# Patient Record
Sex: MALE | Race: WHITE | Employment: OTHER | URBAN - METROPOLITAN AREA
[De-identification: names, ages, dates, MRNs, and addresses within clinical notes are randomized per-mention and may not be internally consistent; named-entity substitution may affect disease eponyms.]

---

## 2019-06-09 ENCOUNTER — HOSPITAL ENCOUNTER (EMERGENCY)
Age: 49
Discharge: HOME | End: 2019-06-09
Attending: EMERGENCY MEDICINE
Payer: COMMERCIAL

## 2019-06-09 VITALS
OXYGEN SATURATION: 99 % | HEIGHT: 70 IN | HEART RATE: 64 BPM | BODY MASS INDEX: 24.9 KG/M2 | RESPIRATION RATE: 18 BRPM | SYSTOLIC BLOOD PRESSURE: 130 MMHG | DIASTOLIC BLOOD PRESSURE: 88 MMHG | TEMPERATURE: 97.9 F | WEIGHT: 173.94 LBS

## 2019-06-09 DIAGNOSIS — H18.892 CORNEAL RUST RING OF LEFT EYE: ICD-10-CM

## 2019-06-09 DIAGNOSIS — S05.02XA ABRASION OF LEFT CORNEA, INITIAL ENCOUNTER: ICD-10-CM

## 2019-06-09 DIAGNOSIS — T15.92XA FOREIGN BODY OF LEFT EYE, INITIAL ENCOUNTER: Primary | ICD-10-CM

## 2019-06-09 PROCEDURE — 99283 EMERGENCY DEPT VISIT LOW MDM: CPT

## 2019-06-09 PROCEDURE — 74011000250 HC RX REV CODE- 250: Performed by: PHYSICIAN ASSISTANT

## 2019-06-09 RX ORDER — KETOROLAC TROMETHAMINE 5 MG/ML
1 SOLUTION OPHTHALMIC 4 TIMES DAILY
Qty: 1 BOTTLE | Refills: 0 | Status: SHIPPED | OUTPATIENT
Start: 2019-06-09 | End: 2019-06-19

## 2019-06-09 RX ORDER — CIPROFLOXACIN HYDROCHLORIDE 3.5 MG/ML
2 SOLUTION/ DROPS TOPICAL 4 TIMES DAILY
Qty: 5 ML | Refills: 0 | Status: SHIPPED | OUTPATIENT
Start: 2019-06-09 | End: 2019-06-19

## 2019-06-09 RX ORDER — HYDROCODONE BITARTRATE AND ACETAMINOPHEN 5; 325 MG/1; MG/1
1 TABLET ORAL
Qty: 10 TAB | Refills: 0 | Status: SHIPPED | OUTPATIENT
Start: 2019-06-09 | End: 2019-06-12

## 2019-06-09 RX ORDER — TETRACAINE HYDROCHLORIDE 5 MG/ML
2 SOLUTION OPHTHALMIC
Status: COMPLETED | OUTPATIENT
Start: 2019-06-09 | End: 2019-06-09

## 2019-06-09 RX ADMIN — TETRACAINE HYDROCHLORIDE 2 DROP: 5 SOLUTION OPHTHALMIC at 16:37

## 2019-06-09 NOTE — ED PROVIDER NOTES
EMERGENCY DEPARTMENT HISTORY AND PHYSICAL EXAM      Date: 6/9/2019  Patient Name: Isaiah Potts    History of Presenting Illness     Chief Complaint   Patient presents with    Eye Pain     left eye pain: per pt he got metal in it        History Provided By: Patient    HPI: Isaiah Potts, 57 y.o. male with no pertinent past medical history, presents to the ED with cc of metal in left eye. The patient reports that he was cutting metal when a small fragment flew off and landed in his left eye. He was seen in urgent care facility, where they were able to visualize a piece of metal in his eye but they did not have a slit-lamp machines and they referred him to the emergency department for removal.  Pain was improved after tetracaine administration there but has now returned. He denies blurred vision, other injuries. His tetanus is up-to-date. There are no other complaints, changes, or physical findings at this time. PCP: Ibeth Ortiz MD    No current facility-administered medications on file prior to encounter. Current Outpatient Medications on File Prior to Encounter   Medication Sig Dispense Refill    oxycodone-acetaminophen (PERCOCET) 5-325 mg per tablet Take 1-2 tablets by mouth every four (4) hours as needed for Pain. 50 tablet 0    docusate sodium (COLACE) 100 mg capsule Take 1 capsule by mouth two (2) times a day. 30 capsule 0       Past History     Past Medical History:  Past Medical History:   Diagnosis Date    Ill-defined condition     cracked vertebrae L 5       Past Surgical History:  Past Surgical History:   Procedure Laterality Date    HX HEENT      wisdom teeth removed       Family History:  Family History   Problem Relation Age of Onset    Heart Disease Father        Social History:  Social History     Tobacco Use    Smoking status: Never Smoker    Smokeless tobacco: Never Used   Substance Use Topics    Alcohol use:  Yes     Alcohol/week: 0.0 oz     Types: 5 - 10 Shots of liquor per week     Comment: 5-10 weekly    Drug use: No       Allergies:  No Known Allergies      Review of Systems   Review of Systems   Constitutional: Negative for chills and fever. HENT: Negative for ear pain and sore throat. Eyes: Positive for pain and redness. Negative for visual disturbance. Respiratory: Negative for cough and shortness of breath. Cardiovascular: Negative for chest pain and palpitations. Gastrointestinal: Negative for abdominal pain, nausea and vomiting. Genitourinary: Negative for dysuria and hematuria. Musculoskeletal: Negative for back pain and gait problem. Skin: Negative for rash and wound. Neurological: Negative for dizziness and headaches. Psychiatric/Behavioral: Negative for behavioral problems and confusion. All other systems reviewed and are negative. Physical Exam   Physical Exam   Constitutional: He is oriented to person, place, and time. He appears well-developed and well-nourished. Uncomfortable appearing. HENT:   Head: Normocephalic and atraumatic. Eyes: Pupils are equal, round, and reactive to light. Conjunctivae and EOM are normal. Lids are everted and swept, no foreign bodies found. Neck: Normal range of motion. Neck supple. Cardiovascular: Normal rate, regular rhythm and normal heart sounds. Pulmonary/Chest: Effort normal and breath sounds normal.   Musculoskeletal: Normal range of motion. Neurological: He is alert and oriented to person, place, and time. He has normal strength. No cranial nerve deficit or sensory deficit. GCS eye subscore is 4. GCS verbal subscore is 5. GCS motor subscore is 6. Skin: Skin is warm and dry. No rash noted. Psychiatric: He has a normal mood and affect. His behavior is normal.   Nursing note and vitals reviewed. Diagnostic Study Results     Labs -   No results found for this or any previous visit (from the past 12 hour(s)).     Radiologic Studies -   No orders to display CT Results  (Last 48 hours)    None        CXR Results  (Last 48 hours)    None            Medical Decision Making   I am the first provider for this patient. I reviewed the vital signs, available nursing notes, past medical history, past surgical history, family history and social history. Vital Signs-Reviewed the patient's vital signs. Vitals:    06/09/19 1505   BP: 130/88   BP 1 Location: Left arm   BP Patient Position: Sitting   Pulse: 64   Resp: 18   Temp: 97.9 °F (36.6 °C)   SpO2: 99%   Weight: 78.9 kg (173 lb 15.1 oz)   Height: 5' 10\" (1.778 m)            Records Reviewed: Nursing Notes and Old Medical Records    Provider Notes (Medical Decision Making):   Pt presents with foreign body in the left eye. The piece of metal appears to be superficial and there is no evidence of pupillary injury. His tetanus is up-to-date. I discussed that I will attempt to remove it but that if I am unable to, he will need to follow-up with ophthalmology for removal.    ED Course:   Initial assessment performed. The patients presenting problems have been discussed, and they are in agreement with the care plan formulated and outlined with them. I have encouraged them to ask questions as they arise throughout their visit. Procedure Note - Foreign Body:  5:00 PM  Performed by: AUDI Meade  Foreign body was seen in the left eye. Procedural sedation was not used. Eye was anesthestized using tetracaine drops. Foreign body removed carefully using the bevel of an 18 gauge. I was successfully able to dislodge the foreign body using this technique and then remove using a cotton tip applicator. Estimated blood loss: none  The procedure took 1-15 minutes, and pt tolerated well. 5:10 PM -after removal foreign body, there was a rust ring noted to the area. Fluorescein staining revealed a corneal abrasion to the area where the foreign body had been but no additional abrasions.   The lid was everted and there were no foreign bodies. I discussed that we will be giving him an ophthalmology referral for follow-up and removal of rust ring. Disposition:  5:18 PM -    The patient has been re-evaluated and is ready for discharge. Reviewed available results with patient. Counseled patient on diagnosis and care plan. Patient has expressed understanding, and all questions have been answered. Patient agrees with plan and agrees to follow up as recommended, or to return to the ED if their symptoms worsen. Discharge instructions have been provided and explained to the patient, along with reasons to return to the ED. PLAN:  1. Discharge Medication List as of 6/9/2019  5:18 PM      START taking these medications    Details   ketorolac (ACULAR) 0.5 % ophthalmic solution Apply 1 Drop to eye four (4) times daily for 10 days. , Normal, Disp-1 Bottle, R-0      ciprofloxacin HCl (CILOXAN) 0.3 % ophthalmic solution Apply 2 Drops to eye four (4) times daily for 10 days. , Normal, Disp-5 mL, R-0      HYDROcodone-acetaminophen (NORCO) 5-325 mg per tablet Take 1 Tab by mouth every four (4) hours as needed for Pain for up to 3 days. Max Daily Amount: 6 Tabs., Print, Disp-10 Tab, R-0         CONTINUE these medications which have NOT CHANGED    Details   oxycodone-acetaminophen (PERCOCET) 5-325 mg per tablet Take 1-2 tablets by mouth every four (4) hours as needed for Pain., Normal, Disp-50 tablet, R-0      docusate sodium (COLACE) 100 mg capsule Take 1 capsule by mouth two (2) times a day., Print, Disp-30 capsule, R-0           2.    Follow-up Information     Follow up With Specialties Details Why Contact Info    Karen Bailey MD Ophthalmology Call in 1 day to schedule a follow up [address] [place] [address] Way [address] [facility]  [phone]      [facility] EMERGENCY DEPT Emergency Medicine Go to If symptoms worsen [address] [place] [address]  [phone]        Return to ED if worse     Diagnosis Clinical Impression:   1. Foreign body of left eye, initial encounter    2. Abrasion of left cornea, initial encounter    3. Corneal rust ring of left eye            Dilma Gregory.  SIM Basilio

## 2019-06-09 NOTE — DISCHARGE INSTRUCTIONS
Patient Education        Object in the Eye: Care Instructions  Your Care Instructions  It is common for a speck of dirt or a small object, such as an eyelash or an insect, to get in the eye. Usually your tears wash the object out. But the speck can scratch the surface of the eye (cornea). If the eye surface is scratched, it can feel as if something is still in the eye. Most surface scratches are minor and heal on their own in a day or two. If the object was still in your eye, your doctor probably removed it during your exam. Sometimes these objects become stuck deep in the eye and require more treatment. For instance, a metal object may leave a rust ring. Follow-up care is a key part of your treatment and safety. Be sure to make and go to all appointments, and call your doctor if you are having problems. It's also a good idea to know your test results and keep a list of the medicines you take. How can you care for yourself at home? · The doctor probably used medicine to numb your eye. When it wears off in 30 to 60 minutes, your eye pain may come back. Take over-the-counter pain medicine, such as acetaminophen (Tylenol), ibuprofen (Advil, Motrin), or naproxen (Aleve), as needed. Read and follow all instructions on the label. · Do not take two or more pain medicines at the same time unless the doctor told you to. Many pain medicines have acetaminophen, which is Tylenol. Too much acetaminophen (Tylenol) can be harmful. · If your doctor prescribed antibiotics, take them as directed. Do not stop taking them just because you feel better. You need to take the full course of antibiotics. · The doctor may have put a patch over your injured eye. If so, keep your eye closed under the patch. This will make your eye feel better. Do not remove the patch until your doctor has told you to. · If you do not have a patch, keep your hurt eye closed to reduce pain. · Wash your hands before touching your eye.   · Do not rub your injured eye. Rubbing can make it worse. · Use the prescribed eyedrops or ointment as directed. Be sure the dropper or bottle tip is clean. · To put in eyedrops or ointment:  ? Tilt your head back, and pull your lower eyelid down with one finger. ? Drop or squirt the medicine inside the lower lid. ? Close your eye for 30 to 60 seconds to let the drops or ointment move around. ? Do not touch the ointment or dropper tip to your eyelashes or any other surface. · Do not use a contact lens in your hurt eye until your doctor says you can. Also, do not wear eye makeup until your eye heals. · Do not drive if you are wearing an eye patch. You cannot  distances well. · For the first 24 to 48 hours, limit reading and other tasks that require a lot of eye movement. · Bright light may hurt. It may help to wear dark glasses. · To prevent eye injuries in the future, wear safety glasses or goggles when you work with machines or tools, mow the lawn, or ride a bike or motorcycle. When should you call for help? Call 911 anytime you think you may need emergency care. For example, call if:    · You suddenly cannot see or can barely see.    Call your doctor now or seek immediate medical care if:    · You have signs of infection in the eye, such as:  ? Pus or thick discharge coming from the eye.  ? Redness or swelling around the eye.  ? A fever.     · You have new or increasing eye pain.     · It feels like sand is in your eye when you blink.    Watch closely for changes in your health, and be sure to contact your doctor if:    · You are not getting better after 1 day (24 hours). Where can you learn more? Go to http://júnior-maranda.info/. Enter F050 in the search box to learn more about \"Object in the Eye: Care Instructions. \"  Current as of: September 23, 2018  Content Version: 11.9  © 3961-1451 [employer].  Care instructions adapted under license by [employer] (which disclaims liability or warranty for this information). If you have questions about a medical condition or this instruction, always ask your healthcare professional. [place] [address] any warranty or liability for your use of this information. Patient Education        Corneal Scratches: Care Instructions  Your Care Instructions    The cornea is the clear surface that covers the front of the eye. When a speck of dirt, a wood chip, an insect, or another object flies into your eye, it can cause a painful scratch on the cornea. Wearing contact lenses too long or rubbing your eyes can also scratch the cornea. Small scratches usually heal in a day or two. Deeper scratches may take longer. If you have had a foreign object removed from your eye or you have a corneal scratch, you will need to watch for infection and vision problems while your eye heals. Follow-up care is a key part of your treatment and safety. Be sure to make and go to all appointments, and call your doctor if you are having problems. It's also a good idea to know your test results and keep a list of the medicines you take. How can you care for yourself at home? · The doctor probably used a medicine during your exam to numb your eye. When it wears off in 30 to 60 minutes, your eye pain may come back. Take pain medicines exactly as directed. ? If the doctor gave you a prescription medicine for pain, take it as prescribed. ? If you are not taking a prescription pain medicine, ask your doctor if you can take an over-the-counter medicine. ? Do not take two or more pain medicines at the same time unless the doctor told you to. Many pain medicines have acetaminophen, which is Tylenol. Too much acetaminophen (Tylenol) can be harmful. · Do not rub your injured eye. Rubbing can make it worse. · Use the prescribed eyedrops or ointment as directed. Be sure the dropper or bottle tip is clean.  To put in eyedrops or ointment:  ? Tilt your head back, and pull your lower eyelid down with one finger. ? Drop or squirt the medicine inside the lower lid. ? Close your eye for 30 to 60 seconds to let the drops or ointment move around. ? Do not touch the ointment or dropper tip to your eyelashes or any other surface. · Do not use your contact lens in your hurt eye until your doctor says you can. Also, do not wear eye makeup until your eye has healed. · Do not drive if you have blurred vision. · Bright light may hurt. Sunglasses can help. · To prevent eye injuries in the future, wear safety glasses or goggles when you work with machines or tools, mow the lawn, or ride a bike or motorcycle. When should you call for help? Call your doctor now or seek immediate medical care if:    · You have signs of an eye infection, such as:  ? Pus or thick discharge coming from the eye.  ? Redness or swelling around the eye.  ? A fever.     · You have new or worse eye pain.     · You have vision changes.     · It feels like there is something in your eye.     · Light hurts your eye.    Watch closely for changes in your health, and be sure to contact your doctor if:    · You do not get better as expected. Where can you learn more? Go to http://júnior-maranda.info/. Enter E397 in the search box to learn more about \"Corneal Scratches: Care Instructions. \"  Current as of: July 17, 2018  Content Version: 11.9  © 8877-7813 [employer]. Care instructions adapted under license by [employer] (which disclaims liability or warranty for this information). If you have questions about a medical condition or this instruction, always ask your healthcare professional. [place] [address] any warranty or liability for your use of this information.

## 2020-01-16 ENCOUNTER — OFFICE VISIT (OUTPATIENT)
Dept: PRIMARY CARE CLINIC | Age: 50
End: 2020-01-16

## 2020-01-16 VITALS
RESPIRATION RATE: 18 BRPM | HEIGHT: 65 IN | OXYGEN SATURATION: 97 % | TEMPERATURE: 97.4 F | WEIGHT: 180.4 LBS | DIASTOLIC BLOOD PRESSURE: 91 MMHG | HEART RATE: 76 BPM | BODY MASS INDEX: 30.06 KG/M2 | SYSTOLIC BLOOD PRESSURE: 131 MMHG

## 2020-01-16 DIAGNOSIS — J40 BRONCHITIS: Primary | ICD-10-CM

## 2020-01-16 DIAGNOSIS — R05.9 COUGH: ICD-10-CM

## 2020-01-16 DIAGNOSIS — R07.81 RIB PAIN ON LEFT SIDE: ICD-10-CM

## 2020-01-16 RX ORDER — INDOMETHACIN 50 MG/1
CAPSULE ORAL
COMMUNITY
End: 2020-08-18

## 2020-01-16 RX ORDER — METHOCARBAMOL 500 MG/1
TABLET, FILM COATED ORAL
COMMUNITY
End: 2020-08-18

## 2020-01-16 RX ORDER — METHYLPREDNISOLONE 4 MG/1
TABLET ORAL
Qty: 1 DOSE PACK | Refills: 0 | Status: SHIPPED | OUTPATIENT
Start: 2020-01-16 | End: 2020-08-18

## 2020-01-16 NOTE — PROGRESS NOTES
Matt Miramontes is a 54 y.o. male    Room:4    Chief Complaint   Patient presents with    Rib Injury     Pt States \" yudi been coughing for a month that cant control, congestion, now there is a pain under my left rib that hurts all the time, pain started x2 weeks ago, and has taken dayquil and nightquil, assumed that i was coughing so hard that it bruised\".  Cough     Pt States \" i cough so hard i start to see white dots feels like im about to pass out\". Visit Vitals  BP (!) 131/91 (BP 1 Location: Left arm, BP Patient Position: Sitting)   Pulse 76   Temp 97.4 °F (36.3 °C) (Oral)   Resp 18   Ht 5' 5\" (1.651 m)   Wt 180 lb 6.4 oz (81.8 kg)   SpO2 97%   BMI 30.02 kg/m²       Pain Scale: 5/10    1. Have you been to the ER, urgent care clinic since your last visit? Hospitalized since your last visit? No    2. Have you seen or consulted any other health care providers outside of the [address] [place] [address] since your last visit? Include any pap smears or colon screening.  No

## 2020-01-16 NOTE — PROGRESS NOTES
HISTORY OF PRESENT ILLNESS  Chaparro Perez is a 54 y.o. male. Patient reports cough for over 1 month and now with left-sided rib pain. Cough produces thick green sputum occasionally. Cough has been so forceful that he now has left lower rib pain. He was having more congestion initially, but that has improved. No shortness of breath or fever. Patient has tried dayquil and nyquil. Visit Vitals  BP (!) 131/91 (BP 1 Location: Left arm, BP Patient Position: Sitting)   Pulse 76   Temp 97.4 °F (36.3 °C) (Oral)   Resp 18   Ht 5' 5\" (1.651 m)   Wt 180 lb 6.4 oz (81.8 kg)   SpO2 97%   BMI 30.02 kg/m²       HPI    Review of Systems   Respiratory: Positive for cough and sputum production. Negative for shortness of breath and wheezing. Physical Exam  Vitals signs reviewed. Constitutional:       Appearance: Normal appearance. HENT:      Head: Normocephalic. Right Ear: Hearing, tympanic membrane, ear canal and external ear normal.      Left Ear: Hearing, tympanic membrane, ear canal and external ear normal.      Nose: Nose normal.      Mouth/Throat:      Lips: Pink. Mouth: Mucous membranes are moist.      Pharynx: Oropharynx is clear. Neck:      Musculoskeletal: Normal range of motion. Cardiovascular:      Rate and Rhythm: Normal rate and regular rhythm. Pulmonary:      Effort: Pulmonary effort is normal.      Breath sounds: Normal breath sounds. No wheezing. Lymphadenopathy:      Cervical: No cervical adenopathy. Skin:     General: Skin is warm and dry. Neurological:      General: No focal deficit present. Mental Status: He is alert and oriented to person, place, and time. Psychiatric:         Mood and Affect: Mood normal.         Behavior: Behavior normal.       Left lower rib pain with deep palpation; no swelling or bruising  ASSESSMENT and PLAN    ICD-10-CM ICD-9-CM    1. Bronchitis J40 490    2. Rib pain on left side R07.81 786.50 XR RIBS LT W PA CXR MIN 3 V   3.  Cough R05 786.2 XR RIBS LT W PA CXR MIN 3 V     Orders Placed This Encounter    XR RIBS LT W PA CXR MIN 3 V    indomethacin (INDOCIN) 50 mg capsule    methocarbamol (ROBAXIN) 500 mg tablet    methylPREDNISolone (MEDROL DOSEPACK) 4 mg tablet   start medrol dose pack  Splint rib area when coughing or sneezing  Apply ice to painful area  Discussed that cough may linger for weeks  Follow up if no improvement

## 2020-01-16 NOTE — PATIENT INSTRUCTIONS
Bronchitis: Care Instructions  Your Care Instructions    Bronchitis is inflammation of the bronchial tubes, which carry air to the lungs. The tubes swell and produce mucus, or phlegm. The mucus and inflamed bronchial tubes make you cough. You may have trouble breathing. Most cases of bronchitis are caused by viruses like those that cause colds. Antibiotics usually do not help and they may be harmful. Bronchitis usually develops rapidly and lasts about 2 to 3 weeks in otherwise healthy people. Follow-up care is a key part of your treatment and safety. Be sure to make and go to all appointments, and call your doctor if you are having problems. It's also a good idea to know your test results and keep a list of the medicines you take. How can you care for yourself at home? · Take all medicines exactly as prescribed. Call your doctor if you think you are having a problem with your medicine. · Get some extra rest.  · Take an over-the-counter pain medicine, such as acetaminophen (Tylenol), ibuprofen (Advil, Motrin), or naproxen (Aleve) to reduce fever and relieve body aches. Read and follow all instructions on the label. · Do not take two or more pain medicines at the same time unless the doctor told you to. Many pain medicines have acetaminophen, which is Tylenol. Too much acetaminophen (Tylenol) can be harmful. · Take an over-the-counter cough medicine that contains dextromethorphan to help quiet a dry, hacking cough so that you can sleep. Avoid cough medicines that have more than one active ingredient. Read and follow all instructions on the label. · Breathe moist air from a humidifier, hot shower, or sink filled with hot water. The heat and moisture will thin mucus so you can cough it out. · Do not smoke. Smoking can make bronchitis worse. If you need help quitting, talk to your doctor about stop-smoking programs and medicines. These can increase your chances of quitting for good.   When should you call for help? Call 911 anytime you think you may need emergency care. For example, call if:    · You have severe trouble breathing.    Call your doctor now or seek immediate medical care if:    · You have new or worse trouble breathing.     · You cough up dark brown or bloody mucus (sputum).     · You have a new or higher fever.     · You have a new rash.    Watch closely for changes in your health, and be sure to contact your doctor if:    · You cough more deeply or more often, especially if you notice more mucus or a change in the color of your mucus.     · You are not getting better as expected. Where can you learn more? Go to http://júnior-maranda.info/. Enter H333 in the search box to learn more about \"Bronchitis: Care Instructions. \"  Current as of: June 9, 2019  Content Version: 12.2  © 1567-0350 [employer]. Care instructions adapted under license by [employer] (which disclaims liability or warranty for this information). If you have questions about a medical condition or this instruction, always ask your healthcare professional. [address] any warranty or liability for your use of this information.

## 2020-01-25 ENCOUNTER — OFFICE VISIT (OUTPATIENT)
Dept: PRIMARY CARE CLINIC | Age: 50
End: 2020-01-25

## 2020-01-25 VITALS
HEIGHT: 65 IN | BODY MASS INDEX: 29.16 KG/M2 | WEIGHT: 175 LBS | HEART RATE: 82 BPM | RESPIRATION RATE: 16 BRPM | DIASTOLIC BLOOD PRESSURE: 63 MMHG | SYSTOLIC BLOOD PRESSURE: 92 MMHG | TEMPERATURE: 98.8 F | OXYGEN SATURATION: 94 %

## 2020-01-25 DIAGNOSIS — R50.9 FEVER, UNSPECIFIED FEVER CAUSE: Primary | ICD-10-CM

## 2020-01-25 DIAGNOSIS — J11.1 INFLUENZA: ICD-10-CM

## 2020-01-25 LAB
FLUAV+FLUBV AG NOSE QL IA.RAPID: NEGATIVE POS/NEG
FLUAV+FLUBV AG NOSE QL IA.RAPID: POSITIVE POS/NEG
VALID INTERNAL CONTROL?: YES

## 2020-01-25 RX ORDER — OSELTAMIVIR PHOSPHATE 75 MG/1
75 CAPSULE ORAL 2 TIMES DAILY
Qty: 10 CAP | Refills: 0 | Status: SHIPPED | OUTPATIENT
Start: 2020-01-25 | End: 2020-01-30

## 2020-01-25 RX ORDER — ACETAMINOPHEN 325 MG/1
TABLET ORAL
COMMUNITY
End: 2020-08-18

## 2020-01-25 NOTE — PATIENT INSTRUCTIONS
Influenza (Flu): Care Instructions Your Care Instructions Influenza (flu) is an infection in the lungs and breathing passages. It is caused by the influenza virus. There are different strains, or types, of the flu virus from year to year. Unlike the common cold, the flu comes on suddenly and the symptoms, such as a cough, congestion, fever, chills, fatigue, aches, and pains, are more severe. These symptoms may last up to 10 days. Although the flu can make you feel very sick, it usually doesn't cause serious health problems. Home treatment is usually all you need for flu symptoms. But your doctor may prescribe antiviral medicine to prevent other health problems, such as pneumonia, from developing. Older people and those who have a long-term health condition, such as lung disease, are most at risk for having pneumonia or other health problems. Follow-up care is a key part of your treatment and safety. Be sure to make and go to all appointments, and call your doctor if you are having problems. It's also a good idea to know your test results and keep a list of the medicines you take. How can you care for yourself at home? · Get plenty of rest. 
· Drink plenty of fluids, enough so that your urine is light yellow or clear like water. If you have kidney, heart, or liver disease and have to limit fluids, talk with your doctor before you increase the amount of fluids you drink. · Take an over-the-counter pain medicine if needed, such as acetaminophen (Tylenol), ibuprofen (Advil, Motrin), or naproxen (Aleve), to relieve fever, headache, and muscle aches. Read and follow all instructions on the label. No one younger than 20 should take aspirin. It has been linked to Reye syndrome, a serious illness. · Do not smoke. Smoking can make the flu worse. If you need help quitting, talk to your doctor about stop-smoking programs and medicines. These can increase your chances of quitting for good. · Breathe moist air from a hot shower or from a sink filled with hot water to help clear a stuffy nose. · Before you use cough and cold medicines, check the label. These medicines may not be safe for young children or for people with certain health problems. · If the skin around your nose and lips becomes sore, put some petroleum jelly on the area. · To ease coughing: ? Drink fluids to soothe a scratchy throat. ? Suck on cough drops or plain hard candy. ? Take an over-the-counter cough medicine that contains dextromethorphan to help you get some sleep. Read and follow all instructions on the label. ? Raise your head at night with an extra pillow. This may help you rest if coughing keeps you awake. · Take any prescribed medicine exactly as directed. Call your doctor if you think you are having a problem with your medicine. To avoid spreading the flu · Wash your hands regularly, and keep your hands away from your face. · Stay home from school, work, and other public places until you are feeling better and your fever has been gone for at least 24 hours. The fever needs to have gone away on its own without the help of medicine. · Ask people living with you to talk to their doctors about preventing the flu. They may get antiviral medicine to keep from getting the flu from you. · To prevent the flu in the future, get a flu vaccine every fall. Encourage people living with you to get the vaccine. · Cover your mouth when you cough or sneeze. When should you call for help? Call 911 anytime you think you may need emergency care.  For example, call if: 
  · You have severe trouble breathing.  
 Call your doctor now or seek immediate medical care if: 
  · You have new or worse trouble breathing.  
  · You seem to be getting much sicker.  
  · You feel very sleepy or confused.  
  · You have a new or higher fever.  
  · You get a new rash.  
 Watch closely for changes in your health, and be sure to contact your doctor if: 
  · You begin to get better and then get worse.  
  · You are not getting better after 1 week. Where can you learn more? Go to http://júnior-maranda.info/. Enter O807 in the search box to learn more about \"Influenza (Flu): Care Instructions. \" Current as of: June 9, 2019 Content Version: 12.2 © 7608-2125 [employer]. Care instructions adapted under license by [employer] (which disclaims liability or warranty for this information). If you have questions about a medical condition or this instruction, always ask your healthcare professional. [place] [address] any warranty or liability for your use of this information.

## 2020-01-25 NOTE — PROGRESS NOTES
Subjective:   Daisha Adair is a 54 y.o. male who present complaining of flu-like symptoms: fevers, chills, myalgias, congestion, sore throat and cough for 2 days. He denies dyspnea or wheezing. Smoking status: non-smoker. Flu vaccine status: vaccinated currently. Relevant PMH: No pertinent additional PMH. Review of Systems  A comprehensive review of systems was negative except for that written in the HPI. Patient Active Problem List   Diagnosis Code    Spondylolysis of lumbar region M43.06     Patient Active Problem List    Diagnosis Date Noted    Spondylolysis of lumbar region 04/01/2014     Current Outpatient Medications   Medication Sig Dispense Refill    acetaminophen (TYLENOL) 325 mg tablet Take  by mouth every four (4) hours as needed for Pain.  oseltamivir (TAMIFLU) 75 mg capsule Take 1 Cap by mouth two (2) times a day for 5 days. 10 Cap 0    indomethacin (INDOCIN) 50 mg capsule indomethacin 50 mg capsule      methocarbamol (ROBAXIN) 500 mg tablet methocarbamol 500 mg tablet      methylPREDNISolone (MEDROL DOSEPACK) 4 mg tablet Follow dose pack instructions 1 Dose Pack 0    oxycodone-acetaminophen (PERCOCET) 5-325 mg per tablet Take 1-2 tablets by mouth every four (4) hours as needed for Pain. 50 tablet 0    docusate sodium (COLACE) 100 mg capsule Take 1 capsule by mouth two (2) times a day. 30 capsule 0     No Known Allergies  Past Medical History:   Diagnosis Date    Ill-defined condition     cracked vertebrae L 5     Past Surgical History:   Procedure Laterality Date    HX HEENT      wisdom teeth removed    HX HERNIA REPAIR      hernia surgery a while back     Family History   Problem Relation Age of Onset    Heart Disease Father      Social History     Tobacco Use    Smoking status: Never Smoker    Smokeless tobacco: Never Used   Substance Use Topics    Alcohol use:  Yes     Alcohol/week: 0.0 standard drinks     Types: 5 - 10 Shots of liquor per week     Comment: 5-10 weekly       Objective:     Visit Vitals  BP 92/63 (BP 1 Location: Left arm, BP Patient Position: Sitting)   Pulse 82   Temp 98.8 °F (37.1 °C) (Oral)   Resp 16   Ht 5' 5\" (1.651 m)   Wt 175 lb (79.4 kg)   SpO2 94%   BMI 29.12 kg/m²       Appears moderately ill but not toxic; temperature as noted in vitals. Ears normal.   Throat and pharynx normal.    Neck supple. No adenopathy in the neck. Sinuses non tender. The chest is clear. Assessment/Plan:   Influenza very likely from clinical presentation and seasonal pattern  Considerations for specific influenza anti-viral therapy: symptoms present < 48 hours, antiviral therapy is indicated  Symptomatic therapy suggested: call prn if symptoms persist or worsen. Call or return to clinic prn if these symptoms worsen or fail to improve as anticipated. ICD-10-CM ICD-9-CM    1. Fever, unspecified fever cause R50.9 780.60 AMB POC DWAIN INFLUENZA A/B TEST   2. Influenza J11.1 487.1 oseltamivir (TAMIFLU) 75 mg capsule   .

## 2020-01-25 NOTE — PROGRESS NOTES
RM 5    Chief Complaint   Patient presents with    Cough     fever, chills x 1 week        Visit Vitals  BP 92/63 (BP 1 Location: Left arm, BP Patient Position: Sitting)   Pulse 82   Temp 98.8 °F (37.1 °C) (Oral)   Resp 16   Ht 5' 5\" (1.651 m)   Wt 175 lb (79.4 kg)   SpO2 94%   BMI 29.12 kg/m²

## 2020-09-21 RX ORDER — CETIRIZINE HCL 10 MG
TABLET ORAL
Qty: 30 TAB | Refills: 0 | Status: SHIPPED | OUTPATIENT
Start: 2020-09-21 | End: 2022-09-09

## 2021-11-18 ENCOUNTER — OFFICE VISIT (OUTPATIENT)
Dept: PRIMARY CARE CLINIC | Age: 51
End: 2021-11-18

## 2021-11-18 VITALS
BODY MASS INDEX: 30.92 KG/M2 | HEART RATE: 82 BPM | HEIGHT: 65 IN | RESPIRATION RATE: 12 BRPM | WEIGHT: 185.6 LBS | DIASTOLIC BLOOD PRESSURE: 78 MMHG | TEMPERATURE: 97.2 F | OXYGEN SATURATION: 99 % | SYSTOLIC BLOOD PRESSURE: 114 MMHG

## 2021-11-18 DIAGNOSIS — Z20.822 ENCOUNTER FOR SCREENING LABORATORY TESTING FOR COVID-19 VIRUS: ICD-10-CM

## 2021-11-18 DIAGNOSIS — B34.9 VIRAL ILLNESS: Primary | ICD-10-CM

## 2021-11-18 DIAGNOSIS — R05.9 COUGH: ICD-10-CM

## 2021-11-18 LAB — SARS-COV-2 POC: NEGATIVE

## 2021-11-18 PROCEDURE — 99213 OFFICE O/P EST LOW 20 MIN: CPT | Performed by: NURSE PRACTITIONER

## 2021-11-18 PROCEDURE — 87426 SARSCOV CORONAVIRUS AG IA: CPT | Performed by: NURSE PRACTITIONER

## 2021-11-18 NOTE — PATIENT INSTRUCTIONS
Upper Respiratory Infection (Cold): Care Instructions  Your Care Instructions     An upper respiratory infection, or URI, is an infection of the nose, sinuses, or throat. URIs are spread by coughs, sneezes, and direct contact. The common cold is the most frequent kind of URI. The flu and sinus infections are other kinds of URIs. Almost all URIs are caused by viruses. Antibiotics won't cure them. But you can treat most infections with home care. This may include drinking lots of fluids and taking over-the-counter pain medicine. You will probably feel better in 4 to 10 days. The doctor has checked you carefully, but problems can develop later. If you notice any problems or new symptoms, get medical treatment right away. Follow-up care is a key part of your treatment and safety. Be sure to make and go to all appointments, and call your doctor if you are having problems. It's also a good idea to know your test results and keep a list of the medicines you take. How can you care for yourself at home? · To prevent dehydration, drink plenty of fluids. Choose water and other clear liquids until you feel better. If you have kidney, heart, or liver disease and have to limit fluids, talk with your doctor before you increase the amount of fluids you drink. · Take an over-the-counter pain medicine, such as acetaminophen (Tylenol), ibuprofen (Advil, Motrin), or naproxen (Aleve). Read and follow all instructions on the label. · Before you use cough and cold medicines, check the label. These medicines may not be safe for young children or for people with certain health problems. · Be careful when taking over-the-counter cold or flu medicines and Tylenol at the same time. Many of these medicines have acetaminophen, which is Tylenol. Read the labels to make sure that you are not taking more than the recommended dose. Too much acetaminophen (Tylenol) can be harmful.   · Get plenty of rest.  · Do not smoke or allow others to smoke around you. If you need help quitting, talk to your doctor about stop-smoking programs and medicines. These can increase your chances of quitting for good. When should you call for help? Call 911 anytime you think you may need emergency care. For example, call if:    · You have severe trouble breathing. Call your doctor now or seek immediate medical care if:    · You seem to be getting much sicker.     · You have new or worse trouble breathing.     · You have a new or higher fever.     · You have a new rash. Watch closely for changes in your health, and be sure to contact your doctor if:    · You have a new symptom, such as a sore throat, an earache, or sinus pain.     · You cough more deeply or more often, especially if you notice more mucus or a change in the color of your mucus.     · You do not get better as expected. Where can you learn more? Go to http://www.gray.com/  Enter K520 in the search box to learn more about \"Upper Respiratory Infection (Cold): Care Instructions. \"  Current as of: July 6, 2021               Content Version: 13.0  © 2006-2021 [employer]. Care instructions adapted under license by [employer] (which disclaims liability or warranty for this information). If you have questions about a medical condition or this instruction, always ask your healthcare professional. [address] any warranty or liability for your use of this information. Upper Respiratory Infection (Cold): Care Instructions  Your Care Instructions     An upper respiratory infection, or URI, is an infection of the nose, sinuses, or throat. URIs are spread by coughs, sneezes, and direct contact. The common cold is the most frequent kind of URI. The flu and sinus infections are other kinds of URIs. Almost all URIs are caused by viruses. Antibiotics won't cure them. But you can treat most infections with home care.  This may include drinking lots of fluids and taking over-the-counter pain medicine. You will probably feel better in 4 to 10 days. The doctor has checked you carefully, but problems can develop later. If you notice any problems or new symptoms, get medical treatment right away. Follow-up care is a key part of your treatment and safety. Be sure to make and go to all appointments, and call your doctor if you are having problems. It's also a good idea to know your test results and keep a list of the medicines you take. How can you care for yourself at home? · To prevent dehydration, drink plenty of fluids. Choose water and other clear liquids until you feel better. If you have kidney, heart, or liver disease and have to limit fluids, talk with your doctor before you increase the amount of fluids you drink. · Take an over-the-counter pain medicine, such as acetaminophen (Tylenol), ibuprofen (Advil, Motrin), or naproxen (Aleve). Read and follow all instructions on the label. · Before you use cough and cold medicines, check the label. These medicines may not be safe for young children or for people with certain health problems. · Be careful when taking over-the-counter cold or flu medicines and Tylenol at the same time. Many of these medicines have acetaminophen, which is Tylenol. Read the labels to make sure that you are not taking more than the recommended dose. Too much acetaminophen (Tylenol) can be harmful. · Get plenty of rest.  · Do not smoke or allow others to smoke around you. If you need help quitting, talk to your doctor about stop-smoking programs and medicines. These can increase your chances of quitting for good. When should you call for help? Call 911 anytime you think you may need emergency care. For example, call if:    · You have severe trouble breathing.    Call your doctor now or seek immediate medical care if:    · You seem to be getting much sicker.     · You have new or worse trouble breathing.     · You have a new or higher fever.     · You have a new rash. Watch closely for changes in your health, and be sure to contact your doctor if:    · You have a new symptom, such as a sore throat, an earache, or sinus pain.     · You cough more deeply or more often, especially if you notice more mucus or a change in the color of your mucus.     · You do not get better as expected. Where can you learn more? Go to http://www.gray.com/  Enter K520 in the search box to learn more about \"Upper Respiratory Infection (Cold): Care Instructions. \"  Current as of: July 6, 2021               Content Version: 13.0  © 6554-2448 [employer]. Care instructions adapted under license by [employer] (which disclaims liability or warranty for this information). If you have questions about a medical condition or this instruction, always ask your healthcare professional. [place] [address] any warranty or liability for your use of this information. Antibiotics do not help viral illnesses but viral illnesses can become bacterial, especially when you are congested. The goal is to treat the congestion and runny nose. This can be done by using nasal sprays and over the counter medications such as guaifenesin or dextromethorphan for cough, pseudoephedrine for congestion and tylenol or ibuprofen for pain. Do not mix  multiple over the counter medications without reading the label first.  Avoid dairy products as they can thicken mucous. Cool mist humidifier in the bedroom.   Drink lots of water and rest.

## 2021-11-18 NOTE — LETTER
11/18/2021 10:16 AM    Mr. Leona Le  [address] [facility]  [address] [id number]        Results for orders placed or performed in visit on 11/18/21   AMB POC SARS-COV-2   Result Value Ref Range    SARS-COV-2 POC Negative Negative           Sincerely,      MARIELY Wilson

## 2021-11-18 NOTE — PROGRESS NOTES
HISTORY OF PRESENT ILLNESS  Maryann Mckeon is a 52 y.o. male. Patient presents with a 2 day history of T 99.9 max, cough, myalgias, headache, congestion. He reports green mucus. Denies SOB, wheezing or asthma. He is na non smoker. Has a chronic cough. Feeling \"rough. \"    No exposure to chemicals. No ill exposures. Took dayquil and nyquil. Helped somewhat. He was tested for COVID in vehicle with [facility] PCR prior to entering clinic. Negative test result. Past Medical History:   Diagnosis Date    Ill-defined condition     cracked vertebrae L 5     Past Surgical History:   Procedure Laterality Date    HX HEENT      wisdom teeth removed    HX HERNIA REPAIR      hernia surgery a while back         Review of Systems   Constitutional: Positive for fever and malaise/fatigue. Negative for chills. HENT: Positive for congestion and sore throat. Negative for ear pain. Eyes: Negative for redness. Respiratory: Positive for cough and sputum production. Negative for shortness of breath. Cardiovascular: Negative for palpitations. Gastrointestinal: Negative for abdominal pain, nausea and vomiting. Musculoskeletal: Positive for myalgias. Skin: Negative for rash. Neurological: Positive for headaches. All other systems reviewed and are negative. Physical Exam  Vitals reviewed. Constitutional:       General: He is not in acute distress. Appearance: Normal appearance. HENT:      Head: Normocephalic and atraumatic. Right Ear: Tympanic membrane and ear canal normal.      Left Ear: Tympanic membrane and ear canal normal.      Nose: Congestion and rhinorrhea present. Rhinorrhea is clear. Right Turbinates: Enlarged and swollen. Left Turbinates: Enlarged and swollen. Right Sinus: Frontal sinus tenderness present. Mouth/Throat:      Pharynx: Posterior oropharyngeal erythema present. Eyes:      Pupils: Pupils are equal, round, and reactive to light.    Cardiovascular: Rate and Rhythm: Normal rate and regular rhythm. Pulses: Normal pulses. Pulmonary:      Effort: Pulmonary effort is normal.      Breath sounds: Normal breath sounds. No wheezing or rhonchi. Musculoskeletal:      Cervical back: Normal range of motion. Lymphadenopathy:      Cervical: No cervical adenopathy. Skin:     General: Skin is warm. Neurological:      General: No focal deficit present. Mental Status: He is alert. Psychiatric:         Mood and Affect: Mood normal.       Results for orders placed or performed in visit on 11/18/21   AMB POC SARS-COV-2   Result Value Ref Range    SARS-COV-2 POC Negative Negative         ASSESSMENT and PLAN    ICD-10-CM ICD-9-CM    1. Viral illness  B34.9 079.99 AMB POC SARS-COV-2   2. Cough  R05.9 786.2    3. Encounter for screening laboratory testing for COVID-19 virus  Z20.822 V01.79      Encounter Diagnoses   Name Primary?  Viral illness Yes    Cough     Encounter for screening laboratory testing for COVID-19 virus      Orders Placed This Encounter    AMB POC SARS-COV-2 ANTIGEN   Viral URI  Please start your allergy medication  Robitussin for cough  Antibiotics do not help viral illnesses but viral illnesses can become bacterial, especially when you are congested. The goal is to treat the congestion and runny nose. This can be done by using nasal sprays and over the counter medications such as guaifenesin or dextromethorphan for cough, pseudoephedrine for congestion and tylenol or ibuprofen for pain. Do not mix  multiple over the counter medications without reading the label first.  Avoid dairy products as they can thicken mucous. Cool mist humidifier in the bedroom.   Drink lots of water and rest.  Signed By: MARIELY So     November 18, 2021

## 2021-12-22 ENCOUNTER — OFFICE VISIT (OUTPATIENT)
Dept: PRIMARY CARE CLINIC | Age: 51
End: 2021-12-22

## 2021-12-22 DIAGNOSIS — U07.1 COVID-19: Primary | ICD-10-CM

## 2021-12-22 DIAGNOSIS — B34.9 VIRAL ILLNESS: ICD-10-CM

## 2021-12-22 DIAGNOSIS — R05.9 COUGH: ICD-10-CM

## 2021-12-22 LAB — SARS-COV-2 POC: POSITIVE

## 2021-12-22 PROCEDURE — 87426 SARSCOV CORONAVIRUS AG IA: CPT | Performed by: NURSE PRACTITIONER

## 2021-12-22 PROCEDURE — 99441 PR PHYS/QHP TELEPHONE EVALUATION 5-10 MIN: CPT | Performed by: NURSE PRACTITIONER

## 2021-12-22 NOTE — PROGRESS NOTES
Jose Abad is a 52 y.o. male evaluated via telephone on 12/22/2021. Consent:  He and/or health care decision maker is aware that that he may receive a bill for this telephone service, depending on his insurance coverage, and has provided verbal consent to proceed: Yes      Documentation:  Patient arrived in vehicle with family for testing. His symptoms include cough , congestion and fever x 2 days. He has had the COIVD vaccine  I communicated with the patient and/or health care decision maker about positive result  Details of this discussion including any medical advice provided: The patient was called for notification of a POSITIVE test result for COVID-19. The following information was given to the patient:     The COVID-19 test result was positive   Mild and stable symptoms are managed at home     Treatment of coronavirus does not require an antibiotic    For most persons with COVID-19 illness, isolation and precautions can generally be discontinued 10 days after symptom onset and resolution of fever for at least 24 hours, without the use of fever-reducing medications, and with improvement of other symptoms.  A limited number of persons with severe illness or severe immunosuppression may produce replication-competent virus beyond 10 days that may warrant extending duration of isolation and precautions for up to 20 days after symptom onset.  For persons who never develop symptoms, isolation and other precautions can be discontinued 10 days after the date of their first positive RT-PCR test for SARS-CoV-2 RNA.  Patient was instructed to remain isolated until 12/29/2021  [facility] hands often with soap and water for at least 20 seconds or alternatively use hand  with at least 60% alcohol content   Cover coughs and sneezes   Wear a mask when around others if possible   Clean all high-touch surfaces every day, such as doorknobs and cellphones   Continually monitor symptoms.  Contact your medical provider if symptoms are worsening, such as difficulty breathing   For more information visit the [facility] website: [email or web address].[email or web address]     Results for orders placed or performed in visit on 12/22/21   AMB POC SARS-COV-2   Result Value Ref Range    SARS-COV-2 POC Positive (A) Negative         Total Time: minutes: 5-10 minutes    Note: not billable if this call serves to triage the patient into an appointment for the relevant concern      MARIELY Pantoja

## 2021-12-23 ENCOUNTER — TELEPHONE (OUTPATIENT)
Dept: PRIMARY CARE CLINIC | Age: 51
End: 2021-12-23

## 2021-12-23 NOTE — TELEPHONE ENCOUNTER
Patient is looking for instructions on previous appointment, may have been confused or does not remember instructions during phone call.

## 2021-12-24 ENCOUNTER — PATIENT MESSAGE (OUTPATIENT)
Dept: PRIMARY CARE CLINIC | Age: 51
End: 2021-12-24

## 2021-12-24 NOTE — TELEPHONE ENCOUNTER
When calling patient, please inform that I tried to contact thru My Chart but it has not been set up. Also, there is no email address listed that I could send information.

## 2021-12-27 NOTE — TELEPHONE ENCOUNTER
Called and LVM for patient to return call for lab results Unable to assess due to patient's cognitive impairment

## 2022-09-09 ENCOUNTER — OFFICE VISIT (OUTPATIENT)
Dept: PRIMARY CARE CLINIC | Age: 52
End: 2022-09-09

## 2022-09-09 VITALS
RESPIRATION RATE: 18 BRPM | HEART RATE: 70 BPM | OXYGEN SATURATION: 98 % | DIASTOLIC BLOOD PRESSURE: 93 MMHG | WEIGHT: 186 LBS | TEMPERATURE: 97.2 F | BODY MASS INDEX: 29.19 KG/M2 | HEIGHT: 67 IN | SYSTOLIC BLOOD PRESSURE: 140 MMHG

## 2022-09-09 DIAGNOSIS — M25.572 ACUTE LEFT ANKLE PAIN: Primary | ICD-10-CM

## 2022-09-09 DIAGNOSIS — M10.9 ACUTE GOUT OF LEFT ANKLE, UNSPECIFIED CAUSE: ICD-10-CM

## 2022-09-09 PROCEDURE — 99213 OFFICE O/P EST LOW 20 MIN: CPT | Performed by: NURSE PRACTITIONER

## 2022-09-09 RX ORDER — PREDNISONE 10 MG/1
30 TABLET ORAL DAILY
Qty: 21 TABLET | Refills: 1
Start: 2022-09-09 | End: 2022-09-16

## 2022-09-09 NOTE — PROGRESS NOTES
HISTORY OF PRESENT ILLNESS  Lois Vargas is a 54 y.o. male. Patient with left ankle pain x 4 days. Just started. Started limping. Limited mobility. Lateral ankle. Redness and swelling. Non trauma,  No injury. Can bear weight. Started for no reason    Did have injury to same 4 yrs ago. \"Never healed\"       Sprained. Was on crutches. Since then has limited ROM  Ibuprofen. Past Medical History:   Diagnosis Date    Ill-defined condition     cracked vertebrae L 5     Past Surgical History:   Procedure Laterality Date    HX HEENT      wisdom teeth removed    HX HERNIA REPAIR      hernia surgery a while back       Review of Systems   Constitutional:  Negative for fever and malaise/fatigue. Musculoskeletal:  Positive for joint pain. Negative for falls, myalgias and neck pain. Skin:  Negative for itching and rash. Neurological:  Negative for tingling and weakness. Physical Exam  Vitals and nursing note reviewed. Constitutional:       General: He is not in acute distress. Appearance: Normal appearance. He is normal weight. HENT:      Head: Normocephalic and atraumatic. Eyes:      Pupils: Pupils are equal, round, and reactive to light. Cardiovascular:      Rate and Rhythm: Normal rate. Pulses: Normal pulses. Pulmonary:      Effort: Pulmonary effort is normal.   Musculoskeletal:         General: Normal range of motion. Cervical back: Normal range of motion. Feet:    Feet:      Left foot:      Skin integrity: Erythema present. Toenail Condition: Left toenails are normal.      Comments: Edema and erythema lateral malleolus. Extremely tender to touch. Limited ROM inversion due to pain. Negative achilles pain. No pain 5th metatarsal    Skin:     General: Skin is warm. Neurological:      Mental Status: He is alert. Psychiatric:         Mood and Affect: Mood normal.     ASSESSMENT and PLAN    ICD-10-CM ICD-9-CM    1. Acute left ankle pain  M25.572 719.47       2. Acute gout of left ankle, unspecified cause  M10.9 274.01         Encounter Diagnoses   Name Primary? Acute left ankle pain Yes    Acute gout of left ankle, unspecified cause      Orders Placed This Encounter    predniSONE (DELTASONE) 10 mg tablet   Tendonitis, vs Gout   Medications as directed  Take with food. Complete entire course of prescription. Follow plan of care as discussed. Ice to area  If reoccurs, recommend evaluation by Orthopedics for diagnostic testing.   Signed By: MARIELY Wilson     September 9, 2022

## 2022-09-09 NOTE — PROGRESS NOTES
Chief Complaint   Patient presents with    Ankle Pain     Left ankle pain since Monday; taking Ibuprofen prn; rolled ankle about 5 years ago and the ankle has never healed properly.     Visit Vitals  BP (!) 140/93   Pulse 70   Temp 97.2 °F (36.2 °C) (Temporal)   Resp 18   Ht 5' 7\" (1.702 m)   Wt 186 lb (84.4 kg)   SpO2 98%   BMI 29.13 kg/m²